# Patient Record
Sex: MALE | Race: OTHER
[De-identification: names, ages, dates, MRNs, and addresses within clinical notes are randomized per-mention and may not be internally consistent; named-entity substitution may affect disease eponyms.]

---

## 2024-08-16 ENCOUNTER — OFFICE VISIT (OUTPATIENT)
Dept: MEDICAL GROUP | Facility: CLINIC | Age: 19
End: 2024-08-16
Payer: MEDICAID

## 2024-08-16 VITALS
DIASTOLIC BLOOD PRESSURE: 70 MMHG | HEART RATE: 64 BPM | SYSTOLIC BLOOD PRESSURE: 108 MMHG | HEIGHT: 75 IN | WEIGHT: 190 LBS | TEMPERATURE: 98 F | BODY MASS INDEX: 23.62 KG/M2 | OXYGEN SATURATION: 97 %

## 2024-08-16 DIAGNOSIS — Z11.3 ROUTINE SCREENING FOR STI (SEXUALLY TRANSMITTED INFECTION): ICD-10-CM

## 2024-08-16 DIAGNOSIS — Z23 IMMUNIZATION DUE: ICD-10-CM

## 2024-08-16 DIAGNOSIS — F41.9 ANXIETY AND DEPRESSION: ICD-10-CM

## 2024-08-16 DIAGNOSIS — F32.A ANXIETY AND DEPRESSION: ICD-10-CM

## 2024-08-16 PROCEDURE — 99203 OFFICE O/P NEW LOW 30 MIN: CPT | Mod: 25,GE

## 2024-08-16 PROCEDURE — 90471 IMMUNIZATION ADMIN: CPT | Mod: GE

## 2024-08-16 PROCEDURE — 90715 TDAP VACCINE 7 YRS/> IM: CPT | Mod: GE

## 2024-08-16 RX ORDER — ESCITALOPRAM OXALATE 10 MG/1
10 TABLET ORAL DAILY
Qty: 90 TABLET | Refills: 3 | Status: SHIPPED | OUTPATIENT
Start: 2024-08-16 | End: 2024-08-16

## 2024-08-16 RX ORDER — ESCITALOPRAM OXALATE 10 MG/1
10 TABLET ORAL DAILY
Qty: 90 TABLET | Refills: 3 | Status: SHIPPED | OUTPATIENT
Start: 2024-08-16

## 2024-08-16 ASSESSMENT — PATIENT HEALTH QUESTIONNAIRE - PHQ9
SUM OF ALL RESPONSES TO PHQ QUESTIONS 1-9: 9
5. POOR APPETITE OR OVEREATING: 1 - SEVERAL DAYS
CLINICAL INTERPRETATION OF PHQ2 SCORE: 2

## 2024-08-16 ASSESSMENT — ANXIETY QUESTIONNAIRES
6. BECOMING EASILY ANNOYED OR IRRITABLE: SEVERAL DAYS
1. FEELING NERVOUS, ANXIOUS, OR ON EDGE: NEARLY EVERY DAY
GAD7 TOTAL SCORE: 15
4. TROUBLE RELAXING: NEARLY EVERY DAY
IF YOU CHECKED OFF ANY PROBLEMS ON THIS QUESTIONNAIRE, HOW DIFFICULT HAVE THESE PROBLEMS MADE IT FOR YOU TO DO YOUR WORK, TAKE CARE OF THINGS AT HOME, OR GET ALONG WITH OTHER PEOPLE: SOMEWHAT DIFFICULT
2. NOT BEING ABLE TO STOP OR CONTROL WORRYING: MORE THAN HALF THE DAYS
5. BEING SO RESTLESS THAT IT IS HARD TO SIT STILL: MORE THAN HALF THE DAYS
3. WORRYING TOO MUCH ABOUT DIFFERENT THINGS: NEARLY EVERY DAY
7. FEELING AFRAID AS IF SOMETHING AWFUL MIGHT HAPPEN: SEVERAL DAYS

## 2024-08-16 NOTE — ASSESSMENT & PLAN NOTE
Due for STI screening, no acute concerns for today's visit.  Denies any prior history of STIs.  Currently sexually active with 1 partner (female) using NuvaRing and condoms as birth control.    Plan:  - Order GC chlamydia urine, RPR, HIV, hepatitis C, HIV

## 2024-08-16 NOTE — ASSESSMENT & PLAN NOTE
Chronic, untreated, open to starting pharmacotherapy.  PHQ-9 score 9 and EULALIA-7 score 15 today.    Plan:  - Start escitalopram 10 mg daily  - Follow-up in 4 to 6 weeks

## 2024-08-16 NOTE — PROGRESS NOTES
"  Subjective:     CC: Tdap vaccine    HISTORY OF THE PRESENT ILLNESS: Patient is a 18 y.o. male. This pleasant patient is here today to establish care and discuss Tdap vaccine.  Patient is a student at [employer] and is overdue for his Tdap and would like to obtain a today.  Additionally, through further history taking, it was determined that the patient suffers from anxiety/panic attacks and feels depressed at times due to his overwhelming anxiety.  He denies any previous diagnosis of anxiety/depression but does report his mother suffers from both.  Currently, patient uses breathing techniques as well as music as coping mechanisms.  He is open to starting pharmacotherapy at this time.  PHQ-9 score: 9  EULALIA 7 score: 15    PAST MEDICAL HISTORY:  Past Medical History:   Diagnosis Date    Left wrist fracture        PAST SURGICAL HISTORY:  History reviewed. No pertinent surgical history.    FAMILY HISTORY:  Family History   Problem Relation Age of Onset    Diabetes Mother     Breast Cancer Mother     Depression Mother     Anxiety disorder Mother        SOCIAL HISTORY:   Pt lives by himself in [place].   Tobacco use: Denies   ETOH use: Socially  Drug use: Marijuana, smoked    ALLERGIES:  No Known Allergies    MEDICATIONS:    Current Outpatient Medications:     escitalopram (LEXAPRO) 10 MG Tab, Take 1 Tablet by mouth every day., Disp: 90 Tablet, Rfl: 3    ROS:   Gen: no fevers/chills, no changes in weight  Eyes: no changes in vision  ENT: no changes in hearing  Pulm: no sob, no cough  CV: no chest pain, no palpitations  GI: no nausea/vomiting, no diarrhea  MSk: no myalgias  Skin: no rash  Neuro: no headaches, no numbness/tingling      Objective:     Exam: /70   Pulse 64   Temp 36.7 °C (98 °F)   Ht 1.905 m (6' 3\")   Wt 86.2 kg (190 lb)   SpO2 97%  Body mass index is 23.75 kg/m².    General: Normal appearing. No distress.  HEENT: Normocephalic. Eyes conjunctiva clear lids without ptosis, pupils equal and reactive to light " accommodation, ears normal shape and contour, canals are clear bilaterally, tympanic membranes are benign, nasal mucosa benign, oropharynx is without erythema, edema or exudates.   Neck: Supple, thyroid is not enlarged.  Pulmonary: Clear to ausculation.  Normal effort. No rales, ronchi, or wheezing.  Cardiovascular: Regular rate and rhythm without murmur. Carotid and radial pulses are intact and equal bilaterally.  Abdomen: Soft, nontender, nondistended. Normal bowel sounds. Liver and spleen are not palpable  Neurologic: Grossly nonfocal  Lymph: No cervical or supraclavicular lymph nodes are palpable  Skin: Warm and dry.  No obvious lesions.  Musculoskeletal: Normal gait. No obvious abnormalities.  No extremity cyanosis, clubbing, or edema.  Psych: Normal mood and affect. Alert and oriented x3. Judgment and insight is normal.    Assessment & Plan:   18 y.o. male with the following -    Anxiety and depression  Chronic, untreated, open to starting pharmacotherapy.  PHQ-9 score 9 and EULALIA-7 score 15 today.    Plan:  - Start escitalopram 10 mg daily  - Follow-up in 4 to 6 weeks    Routine screening for STI (sexually transmitted infection)  Due for STI screening, no acute concerns for today's visit.  Denies any prior history of STIs.  Currently sexually active with 1 partner (female) using NuvaRing and condoms as birth control.    Plan:  - Order GC chlamydia urine, RPR, HIV, hepatitis C, HIV      Return in about 4 weeks (around 9/13/2024), or if symptoms worsen or fail to improve.    José Luis Shelby M.D.

## 2024-09-04 DIAGNOSIS — F41.9 ANXIETY AND DEPRESSION: ICD-10-CM

## 2024-09-04 DIAGNOSIS — F32.A ANXIETY AND DEPRESSION: ICD-10-CM

## 2024-09-04 RX ORDER — ESCITALOPRAM OXALATE 10 MG/1
10 TABLET ORAL DAILY
Qty: 90 TABLET | Refills: 3 | Status: SHIPPED | OUTPATIENT
Start: 2024-09-04

## 2024-09-04 NOTE — TELEPHONE ENCOUNTER
Received request via: Pharmacy    Was the patient seen in the last year in this department? Yes    Does the patient have an active prescription (recently filled or refills available) for medication(s) requested? No    Pharmacy Name: [facility]    Does the patient have detention Plus and need 100-day supply? (This applies to ALL medications) Patient does not have SCP